# Patient Record
Sex: FEMALE | Race: WHITE | Employment: FULL TIME | URBAN - METROPOLITAN AREA
[De-identification: names, ages, dates, MRNs, and addresses within clinical notes are randomized per-mention and may not be internally consistent; named-entity substitution may affect disease eponyms.]

---

## 2023-10-25 PROBLEM — N20.0 KIDNEY STONE: Status: ACTIVE | Noted: 2023-10-25

## 2023-10-25 PROBLEM — E78.5 HYPERLIPIDEMIA: Status: ACTIVE | Noted: 2023-10-25

## 2023-10-25 PROBLEM — R10.9 ABDOMINAL PAIN: Status: ACTIVE | Noted: 2023-10-25

## 2023-10-25 PROBLEM — S61.219A LACERATION OF FINGER: Status: ACTIVE | Noted: 2023-10-25

## 2023-10-25 PROBLEM — N93.8 DYSFUNCTIONAL UTERINE BLEEDING: Status: ACTIVE | Noted: 2023-10-25

## 2023-10-25 PROBLEM — R92.8 ABNORMALITY OF RIGHT BREAST ON SCREENING MAMMOGRAM: Status: ACTIVE | Noted: 2023-10-25

## 2023-10-25 PROBLEM — N95.1 MENOPAUSAL SYMPTOMS: Status: ACTIVE | Noted: 2023-10-25

## 2023-10-27 ENCOUNTER — OFFICE VISIT (OUTPATIENT)
Dept: PRIMARY CARE | Facility: CLINIC | Age: 53
End: 2023-10-27
Payer: COMMERCIAL

## 2023-10-27 ENCOUNTER — HOSPITAL ENCOUNTER (OUTPATIENT)
Dept: RADIOLOGY | Facility: HOSPITAL | Age: 53
Discharge: HOME | End: 2023-10-27
Payer: COMMERCIAL

## 2023-10-27 VITALS
DIASTOLIC BLOOD PRESSURE: 82 MMHG | SYSTOLIC BLOOD PRESSURE: 130 MMHG | TEMPERATURE: 98.4 F | OXYGEN SATURATION: 98 % | WEIGHT: 176 LBS | BODY MASS INDEX: 29.29 KG/M2 | HEART RATE: 72 BPM

## 2023-10-27 DIAGNOSIS — M79.672 LEFT FOOT PAIN: Primary | ICD-10-CM

## 2023-10-27 DIAGNOSIS — M79.672 LEFT FOOT PAIN: ICD-10-CM

## 2023-10-27 PROCEDURE — 99213 OFFICE O/P EST LOW 20 MIN: CPT | Performed by: STUDENT IN AN ORGANIZED HEALTH CARE EDUCATION/TRAINING PROGRAM

## 2023-10-27 PROCEDURE — 73630 X-RAY EXAM OF FOOT: CPT | Mod: LT,FY

## 2023-10-27 PROCEDURE — 1036F TOBACCO NON-USER: CPT | Performed by: STUDENT IN AN ORGANIZED HEALTH CARE EDUCATION/TRAINING PROGRAM

## 2023-10-27 RX ORDER — ROSUVASTATIN CALCIUM 5 MG/1
5 TABLET, COATED ORAL DAILY
COMMUNITY
End: 2024-05-22 | Stop reason: WASHOUT

## 2023-10-27 ASSESSMENT — ENCOUNTER SYMPTOMS
NUMBNESS: 1
HEADACHES: 0
COLOR CHANGE: 0
SHORTNESS OF BREATH: 0
BACK PAIN: 1
WOUND: 0

## 2023-10-27 NOTE — PROGRESS NOTES
Subjective     Patient ID: Ruben Giron is a 53 y.o. female who presents for Pain (LEFT FOOT)  HPI  Been going on for a couple weeks. Initially felt like her socks are bunched up under her toes. Has changed her shoes and added inserts without pain alleviation. Points to base of toes on L foot where the pain is present.  Feels swollen. Using aleve almost every day. History of L foot plantar fasciitis. Follows with [facility]. Takes Ibuprofen/aleve almost daily for her lower back pain. This does help relieve some of the foot pain. Denies any trauma to the foot.    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   Respiratory:  Negative for shortness of breath.    Cardiovascular:  Negative for chest pain.   Musculoskeletal:  Positive for back pain. Negative for gait problem.        +L foot pain   Skin:  Negative for color change, rash and wound.   Neurological:  Positive for numbness. Negative for headaches.     Objective     Physical Exam  General: Pt is sitting up in chair. Appears well-nourished. In no acute distress.  CV: S1S2 normal. Regular rate and rhythm. No murmurs, rubs, or gallops.  Resp: Clear to auscultation in all lobes. Good aeration. No rales, rhonchi, or wheezes.  Extremities: Cap refill <3 seconds. 2+ dorsalis pedis pulses bilaterally. No lower extremity edema bilaterally.   MSK: Lower extremity sensation intact bilaterally. Bilateral active ROM intact. L foot passive ROM intact for inversion,  eversion, dorsi and plantarflexion. Most significant pain to palpation superior pressure to plantar surface of foot-3rd metatarsal, some pain to 2nd and 4th metatarsal.   Skin: Warm and dry. No rashes, bruising or edema.   Psych: Appropriate responses and actions.     Assessment:  53 y.o. female with a history of degenerative disk disease and plantar fasciitis presenting for L foot pain.  R/o fracture due to overuse  -If x-ray negative, concern for stress fracture  -Less likely Melendez's neuroma due absence during physical exam    Plan:  L foot x-ray  Discussed exercises and techniques to help reduce the foot pain, as well as her plantar fasciitis    Shelby Garrison, Student OMS III

## 2023-10-30 NOTE — PROGRESS NOTES
I reviewed and examined the patient. I was present for the key exam elements, and I fully participated in the patient's care. I discussed the management of the care with the resident. I have personally reviewed the pertinent labs and imaging, as well as recent notes, with the patient. I have reviewed the note above and agree with the resident's medical decision making as documented in the resident's note, in addition to the following comments / findings:   Foot Xray unremarkable. Recommend continuing with stretching exercises and wearing shoes with good support.  Agree with the rest of the plan outlined by resident physician. No red flags.      The patient understands and agrees to the assessment and plan of care. Patient has also agreed to follow up and comply with the treatment and evaluation as recommended today. Patient was instructed to call the office at [phone] should questions arise regarding their treatment or care.     Isabel Swan MD, MS  [place] [address]

## 2024-05-17 ENCOUNTER — TELEPHONE (OUTPATIENT)
Dept: PRIMARY CARE | Facility: CLINIC | Age: 54
End: 2024-05-17
Payer: COMMERCIAL

## 2024-05-17 NOTE — TELEPHONE ENCOUNTER
Having hot flashes, not energy, isaac. Is Due for physical but can't schedule October.  Also will be running low on Cholesterol meds.

## 2024-05-22 ENCOUNTER — OFFICE VISIT (OUTPATIENT)
Dept: PRIMARY CARE | Facility: CLINIC | Age: 54
End: 2024-05-22
Payer: COMMERCIAL

## 2024-05-22 VITALS
HEART RATE: 72 BPM | HEIGHT: 66 IN | WEIGHT: 179.4 LBS | BODY MASS INDEX: 28.83 KG/M2 | OXYGEN SATURATION: 97 % | DIASTOLIC BLOOD PRESSURE: 86 MMHG | SYSTOLIC BLOOD PRESSURE: 124 MMHG | TEMPERATURE: 97.8 F

## 2024-05-22 DIAGNOSIS — Z12.11 COLON CANCER SCREENING: ICD-10-CM

## 2024-05-22 DIAGNOSIS — N95.1 HOT FLASHES DUE TO MENOPAUSE: Primary | ICD-10-CM

## 2024-05-22 DIAGNOSIS — E78.5 HYPERLIPIDEMIA, UNSPECIFIED HYPERLIPIDEMIA TYPE: ICD-10-CM

## 2024-05-22 DIAGNOSIS — Z13.1 DIABETES MELLITUS SCREENING: ICD-10-CM

## 2024-05-22 PROBLEM — E78.49 OTHER HYPERLIPIDEMIA: Status: ACTIVE | Noted: 2019-11-14

## 2024-05-22 PROCEDURE — 1036F TOBACCO NON-USER: CPT | Performed by: FAMILY MEDICINE

## 2024-05-22 PROCEDURE — 99214 OFFICE O/P EST MOD 30 MIN: CPT | Performed by: FAMILY MEDICINE

## 2024-05-22 PROCEDURE — 99396 PREV VISIT EST AGE 40-64: CPT | Performed by: FAMILY MEDICINE

## 2024-05-22 RX ORDER — ICOSAPENT ETHYL 1000 MG/1
2 CAPSULE ORAL 2 TIMES DAILY
COMMUNITY
Start: 2023-08-21

## 2024-05-22 RX ORDER — CHOLECALCIFEROL (VITAMIN D3) 50 MCG
2000 TABLET ORAL
COMMUNITY

## 2024-05-22 RX ORDER — ESCITALOPRAM OXALATE 10 MG/1
TABLET ORAL
Qty: 30 TABLET | Refills: 1 | Status: SHIPPED | OUTPATIENT
Start: 2024-05-22

## 2024-05-22 RX ORDER — METAXALONE 800 MG/1
800 TABLET ORAL NIGHTLY
COMMUNITY
Start: 2023-08-16

## 2024-05-22 RX ORDER — ROSUVASTATIN CALCIUM 10 MG/1
10 TABLET, COATED ORAL DAILY
COMMUNITY
End: 2024-05-28

## 2024-05-22 ASSESSMENT — ENCOUNTER SYMPTOMS
DYSPHORIC MOOD: 1
FATIGUE: 1

## 2024-05-22 ASSESSMENT — PATIENT HEALTH QUESTIONNAIRE - PHQ9
SUM OF ALL RESPONSES TO PHQ9 QUESTIONS 1 AND 2: 2
2. FEELING DOWN, DEPRESSED OR HOPELESS: SEVERAL DAYS
1. LITTLE INTEREST OR PLEASURE IN DOING THINGS: SEVERAL DAYS
10. IF YOU CHECKED OFF ANY PROBLEMS, HOW DIFFICULT HAVE THESE PROBLEMS MADE IT FOR YOU TO DO YOUR WORK, TAKE CARE OF THINGS AT HOME, OR GET ALONG WITH OTHER PEOPLE: SOMEWHAT DIFFICULT

## 2024-05-22 ASSESSMENT — PAIN SCALES - GENERAL: PAINLEVEL: 0-NO PAIN

## 2024-05-22 NOTE — PROGRESS NOTES
"Subjective   Patient ID: Ruben Giron is a 54 y.o. female who presents for Hot Flashes (Hot flashes, no energy, isaac, depression, pt would like to stop fish oil it makes her sick every morning.).    HPI     Non smoker  Due for colonoscopy  Due for pap with dr. Reyes, patient aware and will follow up  UTD on immunizations    Hot flashes with energy progressively worsening since 2020 when she was going through menopause   Now feels like her mood has changed more      Review of Systems   Constitutional:  Positive for fatigue.   Psychiatric/Behavioral:  Positive for dysphoric mood.    All other systems reviewed and are negative.      Objective   /86   Pulse 72   Temp 36.6 °C (97.8 °F)   Ht 1.676 m (5' 6\")   Wt 81.4 kg (179 lb 6.4 oz)   SpO2 97%   BMI 28.96 kg/m²     Physical Exam  Constitutional:       Appearance: Normal appearance.   HENT:      Head: Normocephalic and atraumatic.      Right Ear: External ear normal.      Left Ear: External ear normal.      Nose: Nose normal. No congestion or rhinorrhea.   Eyes:      General: No scleral icterus.     Extraocular Movements: Extraocular movements intact.      Conjunctiva/sclera: Conjunctivae normal.   Cardiovascular:      Rate and Rhythm: Normal rate and regular rhythm.      Pulses: Normal pulses.      Heart sounds: Normal heart sounds.   Pulmonary:      Effort: Pulmonary effort is normal.      Breath sounds: Normal breath sounds.   Musculoskeletal:         General: Normal range of motion.      Cervical back: Normal range of motion and neck supple.      Right lower leg: No edema.      Left lower leg: No edema.   Skin:     General: Skin is warm and dry.      Capillary Refill: Capillary refill takes less than 2 seconds.   Neurological:      General: No focal deficit present.      Mental Status: She is alert and oriented to person, place, and time. Mental status is at baseline.   Psychiatric:         Mood and Affect: Mood normal.         Behavior: Behavior " normal.         Thought Content: Thought content normal.         Judgment: Judgment normal.         Assessment/Plan   Diagnoses and all orders for this visit:  Hot flashes due to menopause  -     escitalopram (Lexapro) 10 mg tablet; Take 1/2 tab daily x 7 days then 1 tab daily after  -     CBC; Future  -     TSH with reflex to Free T4 if abnormal; Future  Hyperlipidemia, unspecified hyperlipidemia type  -     Comprehensive Metabolic Panel; Future  -     Lipid Panel; Future  Colon cancer screening  -     Cologuard® colon cancer screening; Future  Diabetes mellitus screening  -     Hemoglobin A1C; Future    Ruben is a 55yo M postmenopausal presenting for hot flashes with mood changes.   Patient has no history of mental health concerns. She has an uncle with bipolar disorder   Never had manic episodes in the past herself  No SI/HI  Will trial lexapro 10mg for 1 month and follow up in 1 month as SSRI will help with hot flashes, mood changes and depression   Labs ordered to rule out other medical etiologies for these concerns  Repeat lipid panel as PMH of lipidemia on crestor and vascepa  Will consider discontinuing vascepa once results return    Cologuard ordered from screening    I have personally reviewed all available pertinent labs, imaging, and consult notes with the patient.     All questions and concerns were addressed. Patient verbalizes understanding instructions and agrees with established plan of care.     Patient seen and discussed with Dr. Moshe Juárez MD

## 2024-05-26 DIAGNOSIS — E78.5 HYPERLIPIDEMIA, UNSPECIFIED HYPERLIPIDEMIA TYPE: Primary | ICD-10-CM

## 2024-05-28 RX ORDER — ROSUVASTATIN CALCIUM 10 MG/1
10 TABLET, COATED ORAL DAILY
Qty: 90 TABLET | Refills: 3 | Status: SHIPPED | OUTPATIENT
Start: 2024-05-28

## 2024-06-25 LAB — NONINV COLON CA DNA+OCC BLD SCRN STL QL: NEGATIVE

## 2024-06-28 DIAGNOSIS — E78.5 HYPERLIPIDEMIA, UNSPECIFIED HYPERLIPIDEMIA TYPE: Primary | ICD-10-CM

## 2024-07-15 DIAGNOSIS — N95.1 HOT FLASHES DUE TO MENOPAUSE: ICD-10-CM

## 2024-07-16 ENCOUNTER — HOSPITAL ENCOUNTER (OUTPATIENT)
Dept: RADIOLOGY | Facility: HOSPITAL | Age: 54
Discharge: HOME | End: 2024-07-16
Payer: COMMERCIAL

## 2024-07-16 DIAGNOSIS — E78.5 HYPERLIPIDEMIA, UNSPECIFIED HYPERLIPIDEMIA TYPE: ICD-10-CM

## 2024-07-16 PROCEDURE — 75571 CT HRT W/O DYE W/CA TEST: CPT

## 2024-07-16 RX ORDER — ESCITALOPRAM OXALATE 10 MG/1
TABLET ORAL
Qty: 90 TABLET | Refills: 2 | Status: SHIPPED | OUTPATIENT
Start: 2024-07-16

## 2024-07-31 ENCOUNTER — TELEPHONE (OUTPATIENT)
Dept: PRIMARY CARE | Facility: CLINIC | Age: 54
End: 2024-07-31
Payer: COMMERCIAL

## 2024-07-31 DIAGNOSIS — M54.9 BACK PAIN, UNSPECIFIED BACK LOCATION, UNSPECIFIED BACK PAIN LATERALITY, UNSPECIFIED CHRONICITY: Primary | ICD-10-CM

## 2024-07-31 NOTE — TELEPHONE ENCOUNTER
Patient called stating that she normally sees Dr. Pinzon for her back issues.  She said that she called the office and they said that he is out of town and they do not have a doctor there covering for him.  She said that she did take a medrol dose pack that he had prescribed and it helped some but she is still having issues.  She also stated that she is out of her muscle relaxer's and she is not sure if it is because of that.    Metaxalone 800 once to twice a day depending on how she is feeling. She stated that she doesn't even always take it.  She was hoping to possibly come in to see you to try to get some kind of relief with what is going on until Dr. Pinzon comes back.

## 2024-08-01 RX ORDER — METAXALONE 800 MG/1
800 TABLET ORAL 3 TIMES DAILY PRN
Qty: 30 TABLET | Refills: 0 | Status: SHIPPED | OUTPATIENT
Start: 2024-08-01 | End: 2024-09-30

## 2024-10-10 DIAGNOSIS — M54.50 LOW BACK PAIN, UNSPECIFIED BACK PAIN LATERALITY, UNSPECIFIED CHRONICITY, UNSPECIFIED WHETHER SCIATICA PRESENT: Primary | ICD-10-CM

## 2024-10-10 RX ORDER — METAXALONE 800 MG/1
800 TABLET ORAL 3 TIMES DAILY PRN
Qty: 30 TABLET | Refills: 0 | Status: SHIPPED | OUTPATIENT
Start: 2024-10-10 | End: 2024-12-09

## 2024-12-05 ENCOUNTER — TELEMEDICINE (OUTPATIENT)
Dept: PRIMARY CARE | Facility: CLINIC | Age: 54
End: 2024-12-05
Payer: COMMERCIAL

## 2024-12-05 DIAGNOSIS — E78.5 HYPERLIPIDEMIA, UNSPECIFIED HYPERLIPIDEMIA TYPE: Primary | ICD-10-CM

## 2024-12-05 DIAGNOSIS — I77.810 DILATION OF THORACIC AORTA (CMS-HCC): ICD-10-CM

## 2024-12-05 PROCEDURE — G2211 COMPLEX E/M VISIT ADD ON: HCPCS | Performed by: STUDENT IN AN ORGANIZED HEALTH CARE EDUCATION/TRAINING PROGRAM

## 2024-12-05 PROCEDURE — 99213 OFFICE O/P EST LOW 20 MIN: CPT | Performed by: STUDENT IN AN ORGANIZED HEALTH CARE EDUCATION/TRAINING PROGRAM

## 2024-12-05 NOTE — PROGRESS NOTES
VIRTUAL VISIT  Clinic Note: Family Medicine    Patient: Ruben Giron  Encounter Date: 12/5/24  Subjective:  No chief complaint on file.      History of Present Illness:  Ruben Giron is a 54 y.o. female with past history of hyperlipidemia who presents virtually to review her recent CT cardiac calcium scoring done on 7/16/2024 with the following impression:  1. Coronary artery calcium score of 31*.  2. Mild prominence ascending thoracic aorta up to 3.9 cm caliber.  Patient was concerned about the prominence thoracic ascending aorta.  Reports adherence to her rosuvastatin 10 mg daily.  Blood pressure less than 130 systolic.  Does not smoke.  She feels well.  No symptoms.  Reports her father and paternal grandfather had scleroderma.    Review of Systems:  See HPI    Past Medical History:   No past medical history on file.    Past Surgical History:   Procedure Laterality Date    OTHER SURGICAL HISTORY  04/05/2019    Lumpectomy    OTHER SURGICAL HISTORY  04/05/2019    Oophorectomy       No family history on file.    Social History     Socioeconomic History    Marital status:      Spouse name: Not on file    Number of children: Not on file    Years of education: Not on file    Highest education level: Not on file   Occupational History    Not on file   Tobacco Use    Smoking status: Never    Smokeless tobacco: Never   Vaping Use    Vaping status: Never Used   Substance and Sexual Activity    Alcohol use: Not Currently    Drug use: Never    Sexual activity: Not on file   Other Topics Concern    Not on file   Social History Narrative    Not on file     Social Drivers of Health     Financial Resource Strain: Not on file   Food Insecurity: Not on file   Transportation Needs: Not on file   Physical Activity: Not on file   Stress: Not on file   Social Connections: Not on file   Intimate Partner Violence: Not on file   Housing Stability: Not on file       Medications:  Patient's Medications   New Prescriptions    No  medications on file   Previous Medications    CHOLECALCIFEROL (VITAMIN D-3) 50 MCG (2000 UT) TABLET    Take 1 tablet (2,000 Units) by mouth once daily.    ESCITALOPRAM (LEXAPRO) 10 MG TABLET    Take 1 tablet daily    METAXALONE (SKELAXIN) 800 MG TABLET    Take 1 tablet (800 mg) by mouth once daily at bedtime.    METAXALONE (SKELAXIN) 800 MG TABLET    Take 1 tablet (800 mg) by mouth 3 times a day as needed for muscle spasms.    METAXALONE (SKELAXIN) 800 MG TABLET    Take 1 tablet (800 mg) by mouth 3 times a day as needed for muscle spasms.    ROSUVASTATIN (CRESTOR) 10 MG TABLET    TAKE 1 TABLET BY MOUTH EVERY DAY    VASCEPA 1 GRAM CAPSULE    Take 2 capsules (2 g) by mouth 2 times a day.   Modified Medications    No medications on file   Discontinued Medications    No medications on file     Objective:    There were no vitals taken for this visit.    Physical Exam    Results Reviewed:   7/16/2024 CT cardiac scoring without IV contrast revealed:  IMPRESSION:  1. Coronary artery calcium score of 31*.  2. Mild prominence ascending thoracic aorta up to 3.9 cm caliber.    Assessment and Plan:       1. Hyperlipidemia, unspecified hyperlipidemia type (Primary)  2. Dilation of thoracic aorta (CMS-HCC)  CT cardiac scoring 7/16/2024 revealed mild prominence ascending thoracic aorta up to 3.9 cm.  Discussed the findings and management.  With shared decision making,  - Referred her to cardiologist Shahab Luna MD for further evaluation and management.  Consider obtaining transthoracic echo to evaluate valvular disease and surveillance given size yearly with CT  - Obtain lipid panel to titrate her rosuvastatin 10 mg daily  - Obtain hemoglobin A1c to screen for diabetes  - Monitor blood pressure systolic goal of less than 130    Return to clinic as needed.  Patient understands and agrees to plan. All questions and concerns were addressed.    Eleno Grullon MD  Primary Care Sports Medicine Fellow  [facility]  [place]  [facility]

## 2025-01-02 ENCOUNTER — APPOINTMENT (OUTPATIENT)
Dept: PRIMARY CARE | Facility: CLINIC | Age: 55
End: 2025-01-02
Payer: COMMERCIAL

## 2025-01-02 VITALS
WEIGHT: 182 LBS | DIASTOLIC BLOOD PRESSURE: 74 MMHG | HEART RATE: 83 BPM | HEIGHT: 64 IN | BODY MASS INDEX: 31.07 KG/M2 | OXYGEN SATURATION: 97 % | SYSTOLIC BLOOD PRESSURE: 110 MMHG

## 2025-01-02 DIAGNOSIS — Z01.818 PREOP EXAMINATION: Primary | ICD-10-CM

## 2025-01-02 PROCEDURE — 3008F BODY MASS INDEX DOCD: CPT | Performed by: FAMILY MEDICINE

## 2025-01-02 PROCEDURE — 99214 OFFICE O/P EST MOD 30 MIN: CPT | Performed by: FAMILY MEDICINE

## 2025-01-02 RX ORDER — EZETIMIBE 10 MG/1
10 TABLET ORAL DAILY
COMMUNITY

## 2025-01-02 ASSESSMENT — PATIENT HEALTH QUESTIONNAIRE - PHQ9
1. LITTLE INTEREST OR PLEASURE IN DOING THINGS: NOT AT ALL
SUM OF ALL RESPONSES TO PHQ9 QUESTIONS 1 AND 2: 0
2. FEELING DOWN, DEPRESSED OR HOPELESS: NOT AT ALL

## 2025-01-02 NOTE — PROGRESS NOTES
I reviewed and examined the patient. I was present for the key exam elements, and I fully participated in the patient's care. I discussed the management of the care with the resident. I have personally reviewed the pertinent labs and imaging, as well as recent notes, with the patient. I have reviewed the note above and agree with the resident's medical decision making as documented in the resident's note, in addition to the following comments / findings:     Agree with the rest of the plan outlined below by resident physician. No red flags.      The patient understands and agrees to the assessment and plan of care. Patient has also agreed to follow up and comply with the treatment and evaluation as recommended today. Patient was instructed to call the office at [phone] should questions arise regarding their treatment or care.     Enrrique Mesa DO, FAOASM  Family Medicine   [address], [address]  [place] [address]     Enrrique Mesa DO

## 2025-01-02 NOTE — PROGRESS NOTES
Subjective   Patient ID: Ruben Giron is a 54 y.o. female who presents for Pre-op Exam.         Reviewed all medications by prescribing practitioner or clinical pharmacist (such as prescriptions, OTCs, herbal therapies and supplements) and documented in the medical record.    HPI  Presenting for pre-op clearance for C5/6 anterior discectomy and mobic arthroplasty. Scheduled for 1/13/24 at [facility]. She is taking 8 weeks off of work to recover. Her  plans to assist with her recovery.    12/13/24 lab work: Cr 0.77, BUN 16, AST 27, ALT 30, WBC 6.3, Hgb 14.8. Denies chest pain, difficulty breathing, h/o MI, stroke, or CHF.     Revised Cardiac Risk Index for Pre-Operative Risk: 0 points, 3.9% risk of major cardiac event.    Review of Systems  All pertinent positive symptoms are included in the history of present illness.    All other systems have been reviewed and are negative and noncontributory to this patient's current ailments.    No past medical history on file.  Past Surgical History:   Procedure Laterality Date    OTHER SURGICAL HISTORY  04/05/2019    Lumpectomy    OTHER SURGICAL HISTORY  04/05/2019    Oophorectomy     Social History     Tobacco Use    Smoking status: Never    Smokeless tobacco: Never   Vaping Use    Vaping status: Never Used   Substance Use Topics    Alcohol use: Not Currently    Drug use: Never     No family history on file.  Immunization History   Administered Date(s) Administered    Influenza, Unspecified 09/29/2015, 09/28/2016, 10/10/2017, 10/03/2018    Influenza, injectable, quadrivalent 09/20/2021, 10/10/2022, 10/05/2023    Moderna SARS-CoV-2 Vaccination 01/06/2021, 02/03/2021, 12/21/2021    Tdap vaccine, age 7 year and older (BOOSTRIX, ADACEL) 07/08/2019    Tetanus toxoid, adsorbed 07/20/2006    Zoster vaccine, recombinant, adult (SHINGRIX) 03/03/2023, 06/02/2023     Current Outpatient Medications   Medication Instructions    escitalopram (Lexapro) 10 mg tablet Take  "1 tablet daily    ezetimibe (ZETIA) 10 mg, Daily    metaxalone (SKELAXIN) 800 mg, Nightly    metaxalone (SKELAXIN) 800 mg, oral, 3 times daily PRN    metaxalone (SKELAXIN) 800 mg, oral, 3 times daily PRN    rosuvastatin (CRESTOR) 10 mg, oral, Daily     Allergies   Allergen Reactions    Penicillins Anaphylaxis       Objective   Vitals:    01/02/25 1327   BP: 110/74   Pulse: 83   SpO2: 97%   Weight: 82.6 kg (182 lb)   Height: 1.626 m (5' 4\")     Body mass index is 31.24 kg/m².    BP Readings from Last 3 Encounters:   01/02/25 110/74   05/22/24 124/86   10/27/23 130/82      Wt Readings from Last 3 Encounters:   01/02/25 82.6 kg (182 lb)   05/22/24 81.4 kg (179 lb 6.4 oz)   10/27/23 79.8 kg (176 lb)        No visits with results within 1 Month(s) from this visit.   Latest known visit with results is:   Office Visit on 05/22/2024   Component Date Value    NONINV COLON CA DNA+OCC * 06/21/2024 Negative      Physical Exam  CONSTITUTIONAL - well nourished, well developed, looks like stated age, in no acute distress, not ill-appearing, and not tired appearing  SKIN - normal skin color and pigmentation, normal skin turgor without rash, lesions, or nodules visualized  HEAD - no trauma, normocephalic  EYES - normal external exam  CHEST - clear to auscultation, no wheezing, no crackles and no rales, good effort  CARDIAC - regular rate and regular rhythm, no skipped beats, no murmur  ABDOMEN - no organomegaly, soft, nontender, nondistended,  no guarding/rebound/rigidity  EXTREMITIES - no obvious or evident edema, no obvious or evident deformities  PSYCHIATRIC - alert, pleasant and cordial, age-appropriate    Assessment/Plan   Problem List Items Addressed This Visit    None  Visit Diagnoses       Preop examination    -  Primary          Preop exam  RCRI Score: 0  Patient appears well  Reviewed recent lab work, no contraindications to surgery  Patient is low risk for complications during surgery    Odilia Carey MD  Transitional " Year  PGY-1

## 2025-04-18 DIAGNOSIS — N95.1 HOT FLASHES DUE TO MENOPAUSE: ICD-10-CM

## 2025-04-18 RX ORDER — ESCITALOPRAM OXALATE 10 MG/1
10 TABLET ORAL DAILY
Qty: 90 TABLET | Refills: 2 | Status: SHIPPED | OUTPATIENT
Start: 2025-04-18

## 2025-05-16 DIAGNOSIS — E78.5 HYPERLIPIDEMIA, UNSPECIFIED HYPERLIPIDEMIA TYPE: ICD-10-CM

## 2025-05-16 RX ORDER — ROSUVASTATIN CALCIUM 10 MG/1
10 TABLET, COATED ORAL DAILY
Qty: 90 TABLET | Refills: 2 | Status: SHIPPED | OUTPATIENT
Start: 2025-05-16

## 2025-06-10 DIAGNOSIS — Z12.31 ENCOUNTER FOR SCREENING MAMMOGRAM FOR BREAST CANCER: ICD-10-CM

## 2025-11-28 ENCOUNTER — APPOINTMENT (OUTPATIENT)
Facility: CLINIC | Age: 55
End: 2025-11-28
Payer: COMMERCIAL